# Patient Record
Sex: FEMALE | Race: WHITE | Employment: FULL TIME | URBAN - METROPOLITAN AREA
[De-identification: names, ages, dates, MRNs, and addresses within clinical notes are randomized per-mention and may not be internally consistent; named-entity substitution may affect disease eponyms.]

---

## 2023-03-30 ENCOUNTER — TELEPHONE (OUTPATIENT)
Dept: PRIMARY CARE | Facility: CLINIC | Age: 58
End: 2023-03-30
Payer: COMMERCIAL

## 2023-03-30 NOTE — TELEPHONE ENCOUNTER
Patient went to do her stress test yesterday and they stuck her 6 times and still would not get the iv in.   Patient wants to know if she can get

## 2023-05-11 ENCOUNTER — OFFICE VISIT (OUTPATIENT)
Dept: PRIMARY CARE | Facility: CLINIC | Age: 58
End: 2023-05-11
Payer: COMMERCIAL

## 2023-05-11 VITALS
HEART RATE: 72 BPM | WEIGHT: 293 LBS | RESPIRATION RATE: 18 BRPM | SYSTOLIC BLOOD PRESSURE: 120 MMHG | DIASTOLIC BLOOD PRESSURE: 70 MMHG | HEIGHT: 66 IN | BODY MASS INDEX: 47.09 KG/M2

## 2023-05-11 DIAGNOSIS — R06.02 SHORTNESS OF BREATH: ICD-10-CM

## 2023-05-11 DIAGNOSIS — E78.2 MIXED HYPERLIPIDEMIA: ICD-10-CM

## 2023-05-11 DIAGNOSIS — R73.09 ELEVATED GLUCOSE: Primary | ICD-10-CM

## 2023-05-11 DIAGNOSIS — R73.03 PREDIABETES: ICD-10-CM

## 2023-05-11 DIAGNOSIS — E66.01 CLASS 3 SEVERE OBESITY DUE TO EXCESS CALORIES WITH SERIOUS COMORBIDITY AND BODY MASS INDEX (BMI) OF 50.0 TO 59.9 IN ADULT (MULTI): Chronic | ICD-10-CM

## 2023-05-11 DIAGNOSIS — G62.9 NEUROPATHY: ICD-10-CM

## 2023-05-11 PROCEDURE — 1036F TOBACCO NON-USER: CPT | Performed by: INTERNAL MEDICINE

## 2023-05-11 PROCEDURE — 3008F BODY MASS INDEX DOCD: CPT | Performed by: INTERNAL MEDICINE

## 2023-05-11 PROCEDURE — 99214 OFFICE O/P EST MOD 30 MIN: CPT | Performed by: INTERNAL MEDICINE

## 2023-05-11 RX ORDER — CYCLOBENZAPRINE HCL 10 MG
1 TABLET ORAL 3 TIMES DAILY
COMMUNITY
Start: 2021-10-07

## 2023-05-11 RX ORDER — METFORMIN HYDROCHLORIDE 500 MG/1
500 TABLET ORAL 2 TIMES DAILY
COMMUNITY
Start: 2023-05-01

## 2023-05-11 RX ORDER — HYDROCODONE BITARTRATE AND ACETAMINOPHEN 5; 300 MG/1; MG/1
1 TABLET ORAL EVERY 6 HOURS PRN
COMMUNITY

## 2023-05-11 RX ORDER — LIOTHYRONINE SODIUM 5 UG/1
TABLET ORAL
COMMUNITY

## 2023-05-11 RX ORDER — PREGABALIN 100 MG/1
CAPSULE ORAL
COMMUNITY

## 2023-05-11 RX ORDER — ALBUTEROL SULFATE 90 UG/1
2 AEROSOL, METERED RESPIRATORY (INHALATION) EVERY 4 HOURS PRN
COMMUNITY
Start: 2023-03-20

## 2023-05-11 ASSESSMENT — PAIN SCALES - GENERAL: PAINLEVEL: 4

## 2023-05-31 ENCOUNTER — TELEPHONE (OUTPATIENT)
Dept: PRIMARY CARE | Facility: CLINIC | Age: 58
End: 2023-05-31
Payer: COMMERCIAL

## 2023-06-01 NOTE — TELEPHONE ENCOUNTER
Patient calling states that she received her calcium scoring test results and would like to make a follow up appointment with Dr. Holder. Patient's calcium scoring test results are in Epic. Please review and let me know where to add her in.

## 2023-06-01 NOTE — TELEPHONE ENCOUNTER
I called the pt she was not able to get into the [facility] location until the end of July.  So she went and had the cardiac CT done at [facility] across the street from this building yesterday. She said you should be receiving the results soon.

## 2023-06-05 ENCOUNTER — TELEPHONE (OUTPATIENT)
Dept: PRIMARY CARE | Facility: CLINIC | Age: 58
End: 2023-06-05
Payer: COMMERCIAL

## 2023-06-05 NOTE — TELEPHONE ENCOUNTER
Brian Holder,  Pt called to schedule an appt to discuss CT results with you. Judith and I looked over your schedule and it doesn't look like you have any 30 minute appointments until 06/29/2023. Could we put pt in a 15 minute slot or would you prefer a 30 minute visit? Thank you.

## 2023-06-06 NOTE — TELEPHONE ENCOUNTER
Pt called again about scheduling an appoint to see Dr. Holder and it has been over a week ands he  is very frustrated she has not heard back from anyone.  She would like to schedule an appoint regarding her CT scan and what she should do as she is very concerned about her results. Please advise.

## 2023-06-07 NOTE — TELEPHONE ENCOUNTER
Spoke with patient and apologized for the delay. She would like to come in June 14th at 4pm. I will add her to Dr. Holder's schedule.

## 2023-06-14 ENCOUNTER — OFFICE VISIT (OUTPATIENT)
Dept: PRIMARY CARE | Facility: CLINIC | Age: 58
End: 2023-06-14
Payer: COMMERCIAL

## 2023-06-14 VITALS
HEART RATE: 81 BPM | HEIGHT: 68 IN | BODY MASS INDEX: 44.41 KG/M2 | WEIGHT: 293 LBS | DIASTOLIC BLOOD PRESSURE: 70 MMHG | SYSTOLIC BLOOD PRESSURE: 116 MMHG | OXYGEN SATURATION: 95 %

## 2023-06-14 DIAGNOSIS — Q25.79 PULMONARY ARTERY ABNORMALITY (HHS-HCC): ICD-10-CM

## 2023-06-14 DIAGNOSIS — R07.9 CHEST PAIN, UNSPECIFIED TYPE: ICD-10-CM

## 2023-06-14 DIAGNOSIS — I25.83 CORONARY ARTERY DISEASE DUE TO LIPID RICH PLAQUE: ICD-10-CM

## 2023-06-14 DIAGNOSIS — R06.02 SHORTNESS OF BREATH: Primary | ICD-10-CM

## 2023-06-14 DIAGNOSIS — I25.10 CORONARY ARTERY DISEASE DUE TO LIPID RICH PLAQUE: ICD-10-CM

## 2023-06-14 PROCEDURE — 3008F BODY MASS INDEX DOCD: CPT | Performed by: INTERNAL MEDICINE

## 2023-06-14 PROCEDURE — 1036F TOBACCO NON-USER: CPT | Performed by: INTERNAL MEDICINE

## 2023-06-14 PROCEDURE — 99214 OFFICE O/P EST MOD 30 MIN: CPT | Performed by: INTERNAL MEDICINE

## 2023-06-14 RX ORDER — ROSUVASTATIN CALCIUM 5 MG/1
TABLET, COATED ORAL
Qty: 30 TABLET | Refills: 5 | Status: SHIPPED | OUTPATIENT
Start: 2023-06-14

## 2023-06-14 RX ORDER — PREGABALIN 100 MG/1
2 CAPSULE ORAL 2 TIMES DAILY
COMMUNITY
Start: 2020-07-16

## 2023-06-14 RX ORDER — LIOTHYRONINE SODIUM 5 UG/1
TABLET ORAL
COMMUNITY

## 2023-06-14 ASSESSMENT — PATIENT HEALTH QUESTIONNAIRE - PHQ9
1. LITTLE INTEREST OR PLEASURE IN DOING THINGS: NOT AT ALL
SUM OF ALL RESPONSES TO PHQ9 QUESTIONS 1 AND 2: 1
10. IF YOU CHECKED OFF ANY PROBLEMS, HOW DIFFICULT HAVE THESE PROBLEMS MADE IT FOR YOU TO DO YOUR WORK, TAKE CARE OF THINGS AT HOME, OR GET ALONG WITH OTHER PEOPLE: NOT DIFFICULT AT ALL
2. FEELING DOWN, DEPRESSED OR HOPELESS: SEVERAL DAYS

## 2023-06-14 NOTE — PATIENT INSTRUCTIONS
Begin 81 mg Aspirin once daily.  Begin crestor 5 mg once nightly; can start with every other night if you are concerned over side effect.  Schedule echocardiogram.  Schedule with Dr Leonard.   See me in August.

## 2023-06-14 NOTE — PROGRESS NOTES
"Subjective   Andie Saavedra is a 57 y.o. female who presents for Results (CT Results).    She had been having tightness in her chest with exertion  She has also been having more shortness of breath, occurring with almost any exertion  She was unable to complete stress test due to failure of venous access  She has hyperlipidemia ; labs were at oustide lab  Her cardiac CT showed a score of 123; also some prominence of main pulmonary artery suggestive of pulmonary hypertension, and upper limits of normal of ascending aorta.    She has sleep apena and using CPAP  Review of Systems    Objective   /70   Pulse 81   Ht 1.727 m (5' 8\")   Wt 148 kg (326 lb 6.4 oz)   SpO2 95%   BMI 49.63 kg/m²    Physical Exam  Visit Vitals  /70   Pulse 81   Ht 1.727 m (5' 8\")   Wt 148 kg (326 lb 6.4 oz)   SpO2 95%   BMI 49.63 kg/m²   Smoking Status Former   BSA 2.66 m²      GEN: NAD. No cyanosis.   HEENT: normal  NECK: no adenopathy, no thyroid enlargment  LUNGS: CTAB. Respirs are normal and unlabored  CV: reg S1/S2 no murmurs  EXT: no leg edema   Assessment/Plan   Problem List Items Addressed This Visit    None    CAD: discussed sytemic nature of atherosclerosis and finding on CT could indicate more of a problem than it seems. She will begin rosuvastatin 5 mg every other day. Also beign 81 mg ASA daily.   Abnormal Pulmonary Artery : discussed that echo would be a better test for Pulm Htn. Answered questions about pulm hypertension: she does have Sleep apnea which is a risk factor for this.   Chest pain : refer to cardiology for ischemia testing.   Shortness of breath : see above.      "

## 2023-08-16 ENCOUNTER — APPOINTMENT (OUTPATIENT)
Dept: PRIMARY CARE | Facility: CLINIC | Age: 58
End: 2023-08-16
Payer: COMMERCIAL

## 2023-12-19 ENCOUNTER — OFFICE VISIT (OUTPATIENT)
Dept: SLEEP MEDICINE | Facility: CLINIC | Age: 58
End: 2023-12-19
Payer: COMMERCIAL

## 2023-12-19 VITALS
OXYGEN SATURATION: 98 % | SYSTOLIC BLOOD PRESSURE: 165 MMHG | HEART RATE: 87 BPM | DIASTOLIC BLOOD PRESSURE: 96 MMHG | BODY MASS INDEX: 50.48 KG/M2 | WEIGHT: 293 LBS

## 2023-12-19 DIAGNOSIS — G47.33 OBSTRUCTIVE SLEEP APNEA: Primary | ICD-10-CM

## 2023-12-19 DIAGNOSIS — E66.01 CLASS 3 SEVERE OBESITY WITH SERIOUS COMORBIDITY AND BODY MASS INDEX (BMI) OF 50.0 TO 59.9 IN ADULT, UNSPECIFIED OBESITY TYPE (MULTI): ICD-10-CM

## 2023-12-19 PROBLEM — F45.8 AEROPHAGIA: Status: ACTIVE | Noted: 2023-12-19

## 2023-12-19 PROBLEM — E66.813 CLASS 3 SEVERE OBESITY IN ADULT: Status: ACTIVE | Noted: 2023-12-19

## 2023-12-19 PROBLEM — I25.10 CAD (CORONARY ARTERY DISEASE): Status: ACTIVE | Noted: 2023-12-19

## 2023-12-19 PROBLEM — J34.89 NASAL OBSTRUCTION: Status: ACTIVE | Noted: 2023-12-19

## 2023-12-19 PROBLEM — I27.20 PULMONARY HTN (MULTI): Status: ACTIVE | Noted: 2023-12-19

## 2023-12-19 PROBLEM — L03.114 CELLULITIS OF HAND, LEFT: Status: ACTIVE | Noted: 2022-05-10

## 2023-12-19 PROBLEM — R73.9 HYPERGLYCEMIA: Status: ACTIVE | Noted: 2023-12-19

## 2023-12-19 PROBLEM — E78.5 ELEVATED LIPIDS: Status: ACTIVE | Noted: 2023-12-19

## 2023-12-19 PROCEDURE — 99213 OFFICE O/P EST LOW 20 MIN: CPT | Performed by: NURSE PRACTITIONER

## 2023-12-19 PROCEDURE — 3008F BODY MASS INDEX DOCD: CPT | Performed by: NURSE PRACTITIONER

## 2023-12-19 PROCEDURE — 1036F TOBACCO NON-USER: CPT | Performed by: NURSE PRACTITIONER

## 2023-12-19 ASSESSMENT — SLEEP AND FATIGUE QUESTIONNAIRES
HOW LIKELY ARE YOU TO NOD OFF OR FALL ASLEEP WHILE SITTING AND TALKING TO SOMEONE: WOULD NEVER DOZE
SITING INACTIVE IN A PUBLIC PLACE LIKE A CLASS ROOM OR A MOVIE THEATER: WOULD NEVER DOZE
HOW LIKELY ARE YOU TO NOD OFF OR FALL ASLEEP WHILE WATCHING TV: SLIGHT CHANCE OF DOZING
WORRIED_DISTRESSED_DUE_TO_SLEEP: NOT AT ALL NOTICEABLE
HOW LIKELY ARE YOU TO NOD OFF OR FALL ASLEEP IN A CAR, WHILE STOPPED FOR A FEW MINUTES IN TRAFFIC: SLIGHT CHANCE OF DOZING
SLEEP_PROBLEM_NOTICEABLE_TO_OTHERS: SOMEWHAT
SLEEP_PROBLEM_INTERFERES_DAILY_ACTIVITIES: A LITTLE
ESS-CHAD TOTAL SCORE: 7
HOW LIKELY ARE YOU TO NOD OFF OR FALL ASLEEP WHILE SITTING QUIETLY AFTER LUNCH WITHOUT ALCOHOL: WOULD NEVER DOZE
WAKING_TOO_EARLY: MILD
SATISFACTION_WITH_CURRENT_SLEEP_PATTERN: SATISFIED
HOW LIKELY ARE YOU TO NOD OFF OR FALL ASLEEP WHILE LYING DOWN TO REST IN THE AFTERNOON WHEN CIRCUMSTANCES PERMIT: MODERATE CHANCE OF DOZING
HOW LIKELY ARE YOU TO NOD OFF OR FALL ASLEEP WHEN YOU ARE A PASSENGER IN A CAR FOR AN HOUR WITHOUT A BREAK: MODERATE CHANCE OF DOZING
HOW LIKELY ARE YOU TO NOD OFF OR FALL ASLEEP WHILE SITTING AND READING: SLIGHT CHANCE OF DOZING

## 2023-12-19 NOTE — PROGRESS NOTES
Patient: Pebbles Saavedra    [id number]  : 1965 -- AGE 57 y.o.    Provider: OSMANY Oakley     Location [facility]   Service Date: 2023              [facility]  Followup Visit Note      HISTORY OF PRESENT ILLNESS       HISTORY OF PRESENT ILLNESS   Pebbles Saavedra is a 57 y.o. female with past medical history of REDD, CAD, pulmonary hypertension, obesity, hypothyroidism who presents to a [facility] for followup. PEBBLES is a .     PAST SLEEP HISTORY  PEBBLES has had a split night sleep study completed on 22 at [facility] showing severe REDD with an RDI 3% of 62.7 and SpO2 sydney of 50%. CPAP was titrated to optimal pressure of 17 with an rRDI of 2.6 and SpO2 sydney of 93%. Recommendation was for initiation of CPAP at 17 with Simplus FFM. Previous testing about 10 years ago, was set up with device but stopped using due to aerophagia.     CURRENT SLEEP HISTORY    On today's visit, the patient reports doing well on her machine. Feels she sleeps well with PAP. Went back to nasal mask- feels it is fitting well. No issues with air pressure or humidity currently. She is debating bariatric surgery through [facility]. She was recommended per chiropractor to sleep on her back, but prefers her side. She occasionally sleeps on the couch and does no wear her PAP machine.     RLS Followup:  no current complaints     Insomnia follow up:  Bedtime: 10- 10:30 PM  Sleep Latency: 3 min  Awakenings:   Wake time: 4:30- 5:30 AM  Naps:   none    ESS: 7   RAQUEL: 5  FOSQ: 31    REVIEW OF SYSTEMS     REVIEW OF SYSTEMS  Review of Systems   All other systems reviewed and are negative.      ALLERGIES AND MEDICATIONS     ALLERGIES  Allergies   Allergen Reactions    Codeine Unknown     vomiting       MEDICATIONS  Current Outpatient Medications   Medication Sig Dispense Refill    albuterol 90 mcg/actuation inhaler Inhale 2 puffs  every 4 hours if needed.      cyclobenzaprine (Flexeril) 10 mg tablet Take 1 tablet (10 mg) by mouth 3 times a day.      HYDROcodone-acetaminophen (Vicodin) 5-300 mg tablet Take 1 tablet by mouth every 6 hours if needed.      INULIN ORAL Take 1 tablet by mouth once daily.      liothyronine (Cytomel) 5 mcg tablet Take by mouth once daily.      liothyronine (Cytomel) 5 mcg tablet Take by mouth once daily.      metFORMIN (Glucophage) 500 mg tablet Take 1 tablet (500 mg) by mouth 2 times a day.      pregabalin (Lyrica) 100 mg capsule TAKE 2 CAPSULES BY MOUTH EVERY MORNING AND 2 CAPSULES BY MOUTH EVERY EVENING.      pregabalin (Lyrica) 100 mg capsule Take 2 capsules (200 mg) by mouth 2 times a day.      rosuvastatin (Crestor) 5 mg tablet One tablet nightly. 30 tablet 5     No current facility-administered medications for this visit.       PPAST MEDICAL HISTORY  History reviewed. No pertinent past medical history.    PAST SURGICAL HISTORY:  Past Surgical History:   Procedure Laterality Date    CT ANGIO CORONARY ART WITH HEARTFLOW IF SCORE >30%  5/31/2023    CT ANGIO CORONARY ART WITH HEARTFLOW IF SCORE >30% 5/31/2023    OTHER SURGICAL HISTORY  08/19/2022    Cholecystectomy    OTHER SURGICAL HISTORY  08/19/2022    Knee replacement    OTHER SURGICAL HISTORY  08/19/2022    Foot surgery    OTHER SURGICAL HISTORY  08/19/2022    Ankle surgery       FAMILY HISTORY  No family history on file.    FAMILY HISTORY: No changes since previous visit. Otherwise non-contributory as charted.       SOCIAL HISTORY  She  reports that she has quit smoking. Her smoking use included cigarettes. She has a 45.00 pack-year smoking history. She has quit using smokeless tobacco. She reports that she does not drink alcohol and does not use drugs.       PHYSICAL EXAM     VITAL SIGNS: BP (!) 165/96   Pulse 87   Wt (!) 151 kg (332 lb)   SpO2 98%   BMI 50.48 kg/m²      PREVIOUS WEIGHTS:  Wt Readings from Last 3 Encounters:   12/19/23 (!) 151 kg (332  "lb)   06/22/23 149 kg (329 lb)   06/20/23 149 kg (329 lb)       Physical Exam  Physical Exam   Constitutional: Alert and oriented, cooperative, no obvious distress   HEENT: Non icteric or anemic, EOM WNL bilaterally   Neck: Supple, no JVD, no goiter, no adenopathy, no rigidity  Chest: CTA bilaterally, no wheezing, crackles, rubs   Cardiac: RRR, S1 and S2, no murmur, rub, thrill   Abdomen: Obese, Soft, nontender, no masses, no organomegaly   Extremities: No clubbing, no LL edema   Neuromuscular: Cranial nerves grossly intact, no focal deficits      RESULTS/DATA     No results found for: \"CO2\", \"IRON\", \"TRANSFERRIN\", \"IRONSAT\", \"TIBC\", \"FERRITIN\"    PAP Adherence:      Aircurve 10 Bilevel Auto- 4/5/2023  Airtouch N20 nasal small---> vitera medium- given in clinic 6/20/2023        ASSESSMENT/PLAN     Ms. Saavedra is a 57 y.o. female and She returns in followup to the [facility] for REDD.    Problem List and Orders  Problem List Items Addressed This Visit             ICD-10-CM    Class 3 severe obesity in adult (CMS/HCC) E66.01     Considering WLS via [facility].  Seeing bariatric NP at [facility] currently  Will continue to monitor  Currently optimized on PAP, recommended to use nightly for greatest benefit.         Obstructive sleep apnea - Primary G47.33     PEBBLES has had a split night sleep study completed on 7/21/22 at [facility] showing severe REDD with an RDI 3% of 62.7 and SpO2 sydney of 50%. CPAP was titrated to optimal pressure of 17 with an rRDI of 2.6 and SpO2 sydney of 93%. Recommendation was for initiation of CPAP at 17 with Simplus FFM. Previous testing about 10 years ago, was set up with device but stopped using due to aerophagia.     Tolerating auto bilevel PAP well. Continue current settings via [facility] with nasal mask  Supply order updated today         Relevant Orders    Positive Airway Pressure (PAP) Therapy       Disposition    Return to clinic in 6-12 months       "

## 2023-12-19 NOTE — PATIENT INSTRUCTIONS
I will be off on leave of absence January through April. Please schedule follow up with one of my colleagues if need to be seen during this time frame.    Dr. Arnie Banerjee, [place] [facility] and Cy Cabezas or Linda Rodríguez and Mini Ward CNP- Cooper Banerjee and Jhonatan Enriquez, EDWARD, Cooper Banerjee Medina Selen Bruck. NP, Marisa and [facility] Sleep Medicine  [facility] [address]  [address] [place] [address]       NAME: Andie Saavedra   DATE:  12/19/23    DIAGNOSIS:   1. Obstructive sleep apnea            Thank you for coming to the Sleep Medicine Clinic today! Your sleep medicine provider today was: OSMANY Oakley Below is a summary of your treatment plan, other important information, and our contact numbers:    TREATMENT PLAN:   - Follow-up in 12 months.  - If not already done, sign up for 'My Chart' and send prescription requests or messages through this      Annual Reminders About Your Sleep Apnea    Your sleep apnea is well controlled based on reviewing your PAP Data Report.     General Reminders:  Continue current machine settings. Continue using machine every night. Need at least 4 hours daily usage.   Remember to clean your mask, tubings, and water chamber regularly as instructed.  Know the replacement schedule of your supplies/ accessories and contact your DME (durable medical equipment) provider if you are due for them.  Avoid driving or operating heavy machinery when drowsy. A person driving while sleepy is 5 times more likely to have an accident. If you feel sleepy, pull over and take a short power nap (sleep for less than 30 minutes). Otherwise, ask somebody to drive you.    Follow-up sooner through [employer] or calling our office if you have any of the following symptoms:  Snoring or stopping breathing while using the machine  Recurrence of fatigue,  sleepiness, insomnia, and other symptoms you had prior using machine  Persistent or worsening fatigue or sleepiness despite regular use of machine  Issues tolerating the machine like bloating sensation, air hunger, too much hot air, too much pressure, taking off mask without recall in the middle of sleep, etc.     Other conservative measures to improve sleep apnea:  Losing weight can lead to some improvement of REDD which means you will need lower pressures in machine to control your REDD. In some patients, they don't need to use the machine after bariatric surgery. Hence, consider medical and/or surgical weight loss especially if your BMI is more than 35.  Avoiding alcohol, sedative-hypnotics, and sedating medications is imperative as these substances can worsen snoring and sleep apnea  If you have nasal congestion or seasonal allergies, improving your breathing through the nose is critical for treating sleep apnea, tolerating CPAP, and improving your sleep; hence, using intranasal steroid spray like Flonase might help. Make sure you know the proper way to use it.  Stay off your back when sleeping.    Common issues with PAP machine:  Mask gets dislodged when turning to the side: Consider getting a CPAP pillow or switching to a mask with hose on top.     Dry mouth:  Your machine has built-in humidifier that heats up the air to prevent dry mouth. It can be adjusted to your comfort. You can try that first and increase setting one level one night at a time to check which setting is comfortable and effective in lessening dry mouth. If dry mouth persists despite humidity setting adjustment, may apply OTC Biotene gel over the gums at bedtime.  If Biotene gel is not effective, consider trying XEROSTOM gel from [employer].  Also, eliminate or reduce dose of meds that can cause dry mouth if possible. Lastly, may try getting a separate room humidifier machine.    Airleaks: Please call DME as they may need to adjust your mask or  "refit you with a different kind of mask. In addition, you can ask DME for tips on getting a good mask seal and mask fit.     Difficulty tolerating the mask: Contact your DME to try a different kind of mask and/or call office to get a referral to Sleep Psychologist for CPAP desensitization. CPAP desensitization technique is a set of strategies that helps patient cope with claustrophobia and anxiety related to wearing mask. Alternatively, we can do a daytime mini-sleep study called PAP-nap trial wherein you will try on different kinds of mask and the sleep technician will try different pressure settings on CPAP and BPAP machines to see which specific pressure is tolerable and comfortable for you.     Water droplets or moisture within the hose and/or mask: This is called rain-out and it is caused by condensation of too much heated humidity on the cooler walls of the hose. If you have rain-out, turn down humidity settings or get a heated hose. If you already have a heated hose, turn up the \"tube temperature\" of the heated hose. Alternatively, if you don't want to get a heated hose or warmer air, may wrap the CPAP hose with stockings to keep it somewhat warm. Also, you need to place the machine on the floor and lower the hose so that water won't travel upward towards your mask.     You can also go to the following EDUCATION WEBSITES for further information:   American Academy of Sleep Medicine http://sleepeducation.org  National Sleep Foundation: https://sleepfoundation.org  American Sleep Apnea Association: https://www.sleepapnea.org (for patients with sleep apnea)    Here at [facility], we wish you a restful sleep!     Instructions - Common REDD Recs: - For your sleep apnea, continue to use your PAP every night and use it whenever you are sleeping.   - Avoid alcohol or sedatives several hours prior to sleeping.   - Get additional supplies for your PAP (e.g., mask, hose, filters) every 3 months or as your " insurance allows from your [employer] company. Replacement cushions for your PAP mask can be requested monthly if airseals are an issue.  - Remember to clean your mask, tubings, and water chamber regularly as instructed.  - Avoid driving or operating heavy machinery when drowsy. A person driving while sleepy is five (5) times more likely to have an accident. If you feel sleepy, pull over and take a short power nap (sleep for less than 30 minutes). Otherwise, ask somebody to drive you.    EASY WAYS TO IMPROVE YOUR SLEEP:  1. Go to bed and wake up at the same time every day.   Aim for 8 hours but some people need less, some need more.   Get out of bed if you are not sleeping.   Limit naps to 20 min or less.   2. Expose yourself to daylight and/ or bright light in the morning.   Go outside or spend time near a window each morning.   You can use a light box (found on Amazon) if you wake before the sunrise.   Limit light exposure in the evenings (including electronic usage).   Try meditation, reading, stretching, deep breathing, warm shower or bath, or yoga nidra as part of your bedtime routine. There are many great FREE, videos or audio tracks on [employer]/ "[employer] etc for guidance.  3. Exercise, in some form, EVERY day, but not too close to bedtime. Consider making this part of your routine at the start of your day, followed by a cool shower.  4. Eat meals at roughly the same time every day. Make sure you are prioritizing fruits, vegetables, whole grains, lean proteins.  5. Time your caffeine intake. Make sure you are not drinking caffeine within 8 to 12 hours prior to your bedtime.   6. Avoid marijuana, alcohol, and nicotine. They will reduce sleep quality in any quantity.  7. Learn to manage anxiety. Psychology services at  can be reached at [phone] to schedule an appointment.     IMPORTANT INFORMATION:     Call 911 for medical emergencies.  Our offices are generally open from Monday-Friday, 9 am - 5 pm.  If you need  to get in touch with me, you may either call me and my team(number is below) or you can use [employer].  If a referral for a test, for CPAP, or for another specialist was made, and you have not heard about scheduling this within a week, please call scheduling at [phone] ([phone]).  If you are unable to make your appointment for clinic or an overnight study, kindly call the office at least 48 hours in advance to cancel and reschedule.  If you are on CPAP, please bring your device's card to each clinic appointment unless told otherwise by your provider.  There are no supporting services by either the sleep doctors or their staff on weekends and Holidays, or after 5 PM on weekdays.   If you have been asked to come to a sleep study, make sure you bring toiletries, a comfy pillow, and any nighttime medications that you may regularly take. Also be sure to eat dinner before you arrive. We generally do not provide meals.      PRESCRIPTIONS:  We require 7 days advanced notice for prescription refills. If we do not receive the request in this time, we cannot guarantee that your medication will be refilled in time. Please contact the sleep nurses listed below for refills or request via [employer].     IMPORTANT PHONE NUMBERS:   Sleep Medicine Clinic Fax: [phone]  Appointments (for Pediatric Sleep Clinic): [phone] - option 1  Appointments (for Adult Sleep Clinic): [phone] - option 2  Appointments (For Sleep Studies): [phone] - option 3  Behavioral Sleep Medicine: [phone]  Sleep Surgery: [phone]  ENT (Otolaryngology): [phone]  Headache Clinic (Neurology): [phone]  Neurology: [phone]  Psychiatry: [phone]  Pulmonary Function Testing (PFT) Center: [phone]  Pulmonary Medicine: [phone]  [employer] (DME): [phone]  [employer] (DME): [phone]  [facility] ([facility]): [phone]    Our Adult Sleep Medicine Team (Please do not  hesitate to call the office or sleep nurse with any questions between appointments):    Adult Sleep Nurses (Beth Lucero, RN and Estefani Carlton, RN):  For clinical questions and refilling prescriptions: [phone]  Email sleep diaries and other documents at: [email or web address]    Adult Sleep Medicine Secretaries:  Toma Navarrete (For Alfred/Kaba/Krise/Strohl/Yeh/Ward):   P: [phone]  F: [phone]  Tahira Lomas (For Olivares/Guggenbiller): P: [phone]  Fax: [phone]  Grace Blanco (For Shira/Blank): P: [phone]  F: [phone]  Yesica Montemayor (For Héctor): P: [phone]  F: [phone]  Erika Gipson (For Audrey/Emerson/Zakhary): P: [phone]  F: [phone]  Mihaela Stevenson (For Raulito/Yash): P: [phone]  F: [phone]     Adult Sleep Medicine Advanced Practice Providers:  Price Zepeda ([place], [place])  Marija Norman ([facility])  Sierra Cr CNP ([facility], [facility])  Amy Enriquez CNP ([place], [place], [facility])  Kiana Anaya ([facility] [place] [place], [facility])  Linda Berrios CNP ([facility])      Our Sleep Testing Center (STC) Locations:  Our team will contact you to schedule your sleep study, however, you can contact us as follow:  Main Phone Line (scheduling only): [phone]), option 3  Adult and Pediatric Locations  [facility] (6 years and older): [facility] - 4th floor ([address] [place] [address]) After hours line: [phone]  [facility] (Main campus: All ages): [place], 6th floor. After hours line: [phone]   [place] (5 years and older; younger considered on case-by-case basis): [address]; [address], [address]. Scheduling  After hours line: [phone]   [place] (6 years and older): [address]; [address]; [address]   [place] (6 years and older): [address]  After hours  "line: [phone]   [place] (13 years and older) in [place]: [address], 2nd floor  After hours line: [phone]   [place] (13 year and older): [address]  After hours line: [phone] (Home studies out of [facility])    Adult Only Locations:   [place] (18 years and older): [address] [facility], 2nd floor   [place] (18 years and older): [address] [facility]; 4th floor  After hours line: [phone]  [facility] (18 years and older) at [place]: [address] [facility]  After hours line: [phone]        CONTACTING YOUR SLEEP MEDICINE PROVIDER:  Send a message directly to your provider through \"My Chart\", which is the email service through your  Records Account: [email or web address]   Call [phone] and leave a message. One of the administrative assistants will forward the message to your sleep medicine provider through \"My Chart\" and/or email.     Your sleep medicine provider for this visit was: Sierra Cr, APRN-CNP    In the event that you are running more than 15 minutes late to your appointment, I will kindly ask you to reschedule.         "

## 2024-01-08 ENCOUNTER — TELEPHONE (OUTPATIENT)
Dept: CARDIOLOGY | Facility: CLINIC | Age: 59
End: 2024-01-08
Payer: COMMERCIAL

## 2024-01-08 NOTE — TELEPHONE ENCOUNTER
Rec'd fax from Mi De Leon, APRN, CNP requesting ok for patient to start Phentermine 37.5mg 1/2 tab for wt loss. Patient did not qualify for Ozempic/Mounjaro/Trulicity. Patient was also started on Jardiance 10mg.  Reports patient has been advised to monitor bp's at home, conts to run 140/80 in office.

## 2024-01-20 NOTE — PATIENT INSTRUCTIONS
Podiatry saw and evaluated while you were here.  They cleared you for discharge and you are weightbearing as tolerated as long as you have the boot on.  They recommended follow-up outpatient:    Please follow up at the [place] office located [address]  [address] [facility] [address].  You may call [phone]    Please follow with your PCP upon discharge as hospital follow-up   Schedule cardiac CAT scan.  [facility] weight loss is in [place].   See me again in 3 to 4 months.

## 2024-11-04 ENCOUNTER — APPOINTMENT (OUTPATIENT)
Dept: PRIMARY CARE | Facility: CLINIC | Age: 59
End: 2024-11-04
Payer: COMMERCIAL

## 2024-11-04 VITALS
RESPIRATION RATE: 16 BRPM | BODY MASS INDEX: 42.8 KG/M2 | HEART RATE: 96 BPM | WEIGHT: 282.4 LBS | HEIGHT: 68 IN | SYSTOLIC BLOOD PRESSURE: 143 MMHG | DIASTOLIC BLOOD PRESSURE: 83 MMHG

## 2024-11-04 DIAGNOSIS — G47.33 OBSTRUCTIVE SLEEP APNEA: ICD-10-CM

## 2024-11-04 DIAGNOSIS — E06.3 HYPOTHYROIDISM DUE TO HASHIMOTO THYROIDITIS: ICD-10-CM

## 2024-11-04 DIAGNOSIS — M48.062 SPINAL STENOSIS OF LUMBAR REGION WITH NEUROGENIC CLAUDICATION: ICD-10-CM

## 2024-11-04 DIAGNOSIS — R73.03 PREDIABETES: ICD-10-CM

## 2024-11-04 DIAGNOSIS — M41.26 OTHER IDIOPATHIC SCOLIOSIS, LUMBAR REGION: ICD-10-CM

## 2024-11-04 DIAGNOSIS — Z01.818 PRE-OP EVALUATION: ICD-10-CM

## 2024-11-04 DIAGNOSIS — R11.0 NAUSEA: Primary | ICD-10-CM

## 2024-11-04 DIAGNOSIS — E78.5 ELEVATED LIPIDS: ICD-10-CM

## 2024-11-04 PROCEDURE — 99214 OFFICE O/P EST MOD 30 MIN: CPT | Performed by: INTERNAL MEDICINE

## 2024-11-04 PROCEDURE — 3008F BODY MASS INDEX DOCD: CPT | Performed by: INTERNAL MEDICINE

## 2024-11-04 RX ORDER — ONDANSETRON 4 MG/1
4 TABLET, ORALLY DISINTEGRATING ORAL EVERY 8 HOURS PRN
Qty: 30 TABLET | Refills: 2 | Status: SHIPPED | OUTPATIENT
Start: 2024-11-04

## 2024-11-04 ASSESSMENT — PAIN SCALES - GENERAL: PAINLEVEL_OUTOF10: 0-NO PAIN

## 2024-11-04 ASSESSMENT — PATIENT HEALTH QUESTIONNAIRE - PHQ9
SUM OF ALL RESPONSES TO PHQ9 QUESTIONS 1 AND 2: 0
2. FEELING DOWN, DEPRESSED OR HOPELESS: NOT AT ALL
1. LITTLE INTEREST OR PLEASURE IN DOING THINGS: NOT AT ALL

## 2024-11-04 NOTE — PROGRESS NOTES
Subjective   Patient ID: Andie Saavedra is a 58 y.o. female who presents for Follow-up (Pt here for surgery clearance. ).    HPI     She is going to have surgery 11/11/24 at the [facility]  Bilateral L3/L4 microdecompression, Right L3/4 microdiscectomy with foraminotimies with General Anesthesia    she has severe lumbar spinal stenosis and scoliosis      She has no history of DVT or PE  No history of bleeding disorder    She has had knee replacement, ankle surgery, and cholecystectomy    She has REDD and uses CPAP ( ? Bi PAP)     She started doing semaglutide in May by another provider  She said at first she had lots of nausea with it   She kind of adjusted to it, was related to what she had eaten   Her A1c changed from 6.5 to 6.0  She has lost 56 pounds  : was 332 in Dec 2023 and is now 282    Seh said that she is no longer thinking about food, 24/7   She used to be so focused on mealtimes, now she will find she forgets to eat  She does try to do protein     She would like a prn rx for ondansetron     She knows she needs to be off of semaglutide for a week prior to surgery  Review of Systems   Respiratory:  Negative for shortness of breath.    Cardiovascular:  Negative for chest pain and palpitations.         Current Outpatient Medications:     albuterol 90 mcg/actuation inhaler, Inhale 2 puffs every 4 hours if needed., Disp: , Rfl:     cyclobenzaprine (Flexeril) 10 mg tablet, Take 1 tablet (10 mg) by mouth 3 times a day., Disp: , Rfl:     HYDROcodone-acetaminophen (Vicodin) 5-300 mg tablet, Take 1 tablet by mouth every 6 hours if needed., Disp: , Rfl:     INULIN ORAL, Take 1 tablet by mouth once daily., Disp: , Rfl:     liothyronine (Cytomel) 5 mcg tablet, Take by mouth once daily., Disp: , Rfl:     liothyronine (Cytomel) 5 mcg tablet, Take by mouth once daily., Disp: , Rfl:     metFORMIN (Glucophage) 500 mg tablet, Take 1 tablet (500 mg) by mouth 2 times a day., Disp: , Rfl:     pregabalin (Lyrica) 100  "mg capsule, TAKE 2 CAPSULES BY MOUTH EVERY MORNING AND 2 CAPSULES BY MOUTH EVERY EVENING., Disp: , Rfl:     pregabalin (Lyrica) 100 mg capsule, Take 2 capsules (200 mg) by mouth 2 times a day., Disp: , Rfl:     rosuvastatin (Crestor) 5 mg tablet, One tablet nightly., Disp: 30 tablet, Rfl: 5    Objective   /83   Pulse 96   Resp 16   Ht 1.727 m (5' 8\")   Wt 128 kg (282 lb 6.4 oz)   BMI 42.94 kg/m²     Physical Exam  Constitutional:       Appearance: Normal appearance.   HENT:      Mouth/Throat:      Mouth: Mucous membranes are moist.      Pharynx: Oropharynx is clear.   Eyes:      Conjunctiva/sclera: Conjunctivae normal.      Pupils: Pupils are equal, round, and reactive to light.   Cardiovascular:      Rate and Rhythm: Normal rate and regular rhythm.      Heart sounds: Normal heart sounds.   Pulmonary:      Effort: Pulmonary effort is normal.      Breath sounds: Normal breath sounds.   Musculoskeletal:      Right lower leg: Edema present.      Left lower leg: Edema present.   Lymphadenopathy:      Cervical: No cervical adenopathy.   Skin:     General: Skin is warm and dry.   Neurological:      General: No focal deficit present.         Assessment/Plan   Problem List Items Addressed This Visit         Pre Operative Evaluation for lumbar spine surgery   Spinal stenosis of lumbar region with neurogenic claudication   Other idiopathic scoliosis, lumbar region  She is medically optimized for the planned surgery. She will not take semaglutide the week before her surgery.        Prediabetes: remain on metformin 500 one twice a day.   Last A1c from 9/26/24 was 6.0 ,  improved from 6/17/24 when was 6.6  Elevated lipids remain on rosuvastatin .     Relevant Orders    Lipid Panel    Hypothyroidism she remains on liothyronine    Obstructive sleep apnea keep using CPAP ( or Bi PAP)             Other Visit Diagnoses       Nausea    -  rx sent for this as she experiences some nausea from semaglutide.     Relevant " Medications    ondansetron ODT (Zofran-ODT) 4 mg disintegrating tablet    See me in spring for annual physical.

## 2024-11-04 NOTE — PATIENT INSTRUCTIONS
Good luck with your surgery.    I sent in for zofran with some refills.     See me in the spring for annual physical.

## 2024-12-01 PROBLEM — M41.26 OTHER IDIOPATHIC SCOLIOSIS, LUMBAR REGION: Status: ACTIVE | Noted: 2024-12-01

## 2024-12-01 PROBLEM — M48.062 SPINAL STENOSIS OF LUMBAR REGION WITH NEUROGENIC CLAUDICATION: Status: ACTIVE | Noted: 2024-12-01

## 2024-12-01 PROBLEM — F45.8 AEROPHAGIA: Status: RESOLVED | Noted: 2023-12-19 | Resolved: 2024-12-01

## 2024-12-01 ASSESSMENT — ENCOUNTER SYMPTOMS
PALPITATIONS: 0
SHORTNESS OF BREATH: 0

## 2025-04-17 PROBLEM — Z00.00 ROUTINE ADULT HEALTH MAINTENANCE: Status: ACTIVE | Noted: 2025-04-17

## 2025-04-17 PROBLEM — R73.03 PREDIABETES: Status: ACTIVE | Noted: 2025-04-17

## 2025-04-17 NOTE — PROGRESS NOTES
"Subjective   Patient ID: Andie Saavedra is a 59 y.o. female who presents for annual physical.    HPI     Uses progesterone cream and Chaste tree for hot flashes.  Went through menopause 6-7 years ago.    She feels she is stuck in her weight loss.  Staying between 270-275.  Reports she is eating smaller portions, a healthier diet and is walking more for exercise.  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    Has always struggled with constipation since childhood.     Continues seeing [facility]  with sleep medicine.     States that her right leg lymphedema has continued to be more swollen than her left leg   Surgery resolved sciatica but still experiencing muscle pain.  Has to lose 70 more lbs to be considered for back surgery.   Chiropractor and massage helps alleviate pain.  Continues to use cane to walk.    Outside labs  Glucose- 109  Kidney function - good  Liver function- good   Sodium and potassium -good     Does not do mammograms- scheduled for thermograms next month.    Reviewed and updated medication list.    Review of Systems   Constitutional:  Negative for unexpected weight change.        +hot flashes   Respiratory:  Negative for shortness of breath.    Cardiovascular:  Positive for leg swelling (b/l but worse in right). Negative for chest pain and palpitations.   Gastrointestinal:  Positive for constipation. Negative for diarrhea.   Genitourinary: Negative.    Musculoskeletal:  Positive for back pain.       Current Medications[1]    Objective   /82   Pulse 87   Resp 16   Ht 1.727 m (5' 8\")   Wt 126 kg (277 lb 9.6 oz)   BMI 42.21 kg/m²     Physical Exam  Constitutional:       Appearance: Normal appearance.   Eyes:      Conjunctiva/sclera: Conjunctivae normal.      Pupils: Pupils are equal, round, and reactive to light.   Cardiovascular: "      Rate and Rhythm: Normal rate and regular rhythm.      Heart sounds: Normal heart sounds.   Pulmonary:      Effort: Pulmonary effort is normal.      Breath sounds: Normal breath sounds.   Musculoskeletal:      Left hand: Swelling present.      Right lower leg: Swelling present. Edema present.      Left lower leg: Swelling present. Edema present.   Lymphadenopathy:      Cervical: No cervical adenopathy.         Assessment/Plan   Problem List Items Addressed This Visit          Cardiac and Vasculature    CAD (coronary artery disease)    Remain on Rosuvastatin 5 mg and ASA 81 mg daily          Relevant Orders    Comprehensive Metabolic Panel    Lipid Panel    White coat syndrome with high blood pressure without hypertension    Recheck improved to 120/82.            Endocrine/Metabolic    Hypothyroidism    Remain on Cytomel 200 mcg daily          Prediabetes    Remain on Metformin 1,000 mg daily   Check A1c before next visit.         Relevant Orders    Hemoglobin A1C    Comprehensive Metabolic Panel    Albumin-Creatinine Ratio, Urine Random       Health Encounters    Routine adult health maintenance - Primary    Annual physical completed today.  Updated medication list today.  Referral sent today to obtain colonoscopy with Dr Mar.   Ordered urinalysis that she will have done here at             Infectious Diseases    Cellulitis of hand, left       Sleep    Obstructive sleep apnea    Continue using CPAP or BiPAP  Continue following with sleep medicine.            Symptoms and Signs    Lymphedema of both lower extremities     Other Visit Diagnoses         Screen for colon cancer        Relevant Orders    Referral to General Surgery            Please return to see me in 6 months for a 30 minute follow up.     Scribe Attestation  By signing my name below, IAmaya Scribe   attest that this documentation has been prepared under the direction and in the presence of Francine Holder DO.         [1]    Current Outpatient Medications:     metFORMIN  mg 24 hr tablet, Take 2 tablets (1,000 mg) by mouth 2 times daily (morning and late afternoon). Do not crush, chew, or split., Disp: , Rfl:     semaglutide 2.5 mg/mL solution, Inject 2.5 mg under the skin 1 (one) time per week., Disp: , Rfl:     albuterol 90 mcg/actuation inhaler, Inhale 2 puffs every 4 hours if needed., Disp: , Rfl:     cyclobenzaprine (Flexeril) 10 mg tablet, Take 1 tablet (10 mg) by mouth 3 times a day., Disp: , Rfl:     HYDROcodone-acetaminophen (Vicodin) 5-300 mg tablet, Take 1 tablet by mouth every 6 hours if needed., Disp: , Rfl:     INULIN ORAL, Take 1 tablet by mouth once daily., Disp: , Rfl:     liothyronine (Cytomel) 5 mcg tablet, Take by mouth once daily., Disp: , Rfl:     liothyronine (Cytomel) 5 mcg tablet, Take by mouth once daily., Disp: , Rfl:     ondansetron ODT (Zofran-ODT) 4 mg disintegrating tablet, Take 1 tablet (4 mg) by mouth every 8 hours if needed for nausea or vomiting., Disp: 30 tablet, Rfl: 2    pregabalin (Lyrica) 100 mg capsule, TAKE 2 CAPSULES BY MOUTH EVERY MORNING AND 2 CAPSULES BY MOUTH EVERY EVENING., Disp: , Rfl:     pregabalin (Lyrica) 100 mg capsule, Take 2 capsules (200 mg) by mouth 2 times a day., Disp: , Rfl:     rosuvastatin (Crestor) 5 mg tablet, One tablet nightly., Disp: 30 tablet, Rfl: 5

## 2025-04-17 NOTE — PATIENT INSTRUCTIONS
I put in a referral for you to have a colonoscopy done.  You can schedule with Dr Mar.      Return to see me in 6 months.    I ordered a urine test for the diabetes, and also you can get the blood test at your office.

## 2025-04-17 NOTE — ASSESSMENT & PLAN NOTE
Annual physical completed today.  Updated medication list today.  Referral sent today to obtain colonoscopy with Dr Mar.   Ordered urinalysis that she will have done here at

## 2025-04-23 ENCOUNTER — APPOINTMENT (OUTPATIENT)
Dept: PRIMARY CARE | Facility: CLINIC | Age: 60
End: 2025-04-23
Payer: COMMERCIAL

## 2025-04-23 VITALS
DIASTOLIC BLOOD PRESSURE: 82 MMHG | RESPIRATION RATE: 16 BRPM | BODY MASS INDEX: 42.07 KG/M2 | WEIGHT: 277.6 LBS | HEART RATE: 87 BPM | SYSTOLIC BLOOD PRESSURE: 120 MMHG | HEIGHT: 68 IN

## 2025-04-23 DIAGNOSIS — Z00.00 ROUTINE ADULT HEALTH MAINTENANCE: Primary | ICD-10-CM

## 2025-04-23 DIAGNOSIS — Z00.00 ENCOUNTER FOR PREVENTATIVE ADULT HEALTH CARE EXAMINATION: ICD-10-CM

## 2025-04-23 DIAGNOSIS — R73.03 PREDIABETES: ICD-10-CM

## 2025-04-23 DIAGNOSIS — I89.0 LYMPHEDEMA OF BOTH LOWER EXTREMITIES: ICD-10-CM

## 2025-04-23 DIAGNOSIS — E06.3 HYPOTHYROIDISM DUE TO HASHIMOTO THYROIDITIS: ICD-10-CM

## 2025-04-23 DIAGNOSIS — G47.33 OBSTRUCTIVE SLEEP APNEA: ICD-10-CM

## 2025-04-23 DIAGNOSIS — I25.10 CORONARY ARTERY DISEASE, UNSPECIFIED VESSEL OR LESION TYPE, UNSPECIFIED WHETHER ANGINA PRESENT, UNSPECIFIED WHETHER NATIVE OR TRANSPLANTED HEART: ICD-10-CM

## 2025-04-23 DIAGNOSIS — L03.114 CELLULITIS OF HAND, LEFT: ICD-10-CM

## 2025-04-23 DIAGNOSIS — Z12.11 SCREEN FOR COLON CANCER: ICD-10-CM

## 2025-04-23 DIAGNOSIS — R03.0 WHITE COAT SYNDROME WITH HIGH BLOOD PRESSURE WITHOUT HYPERTENSION: ICD-10-CM

## 2025-04-23 PROCEDURE — 1036F TOBACCO NON-USER: CPT | Performed by: INTERNAL MEDICINE

## 2025-04-23 PROCEDURE — 3008F BODY MASS INDEX DOCD: CPT | Performed by: INTERNAL MEDICINE

## 2025-04-23 PROCEDURE — 3079F DIAST BP 80-89 MM HG: CPT | Performed by: INTERNAL MEDICINE

## 2025-04-23 PROCEDURE — 3074F SYST BP LT 130 MM HG: CPT | Performed by: INTERNAL MEDICINE

## 2025-04-23 PROCEDURE — 99396 PREV VISIT EST AGE 40-64: CPT | Performed by: INTERNAL MEDICINE

## 2025-04-23 RX ORDER — METFORMIN HYDROCHLORIDE 500 MG/1
1000 TABLET, EXTENDED RELEASE ORAL
COMMUNITY

## 2025-04-23 RX ORDER — SEMAGLUTIDE 2.5 MG/ML
2.5 VIAL (ML) SUBCUTANEOUS
COMMUNITY

## 2025-04-23 ASSESSMENT — ENCOUNTER SYMPTOMS
PALPITATIONS: 0
BACK PAIN: 1
SHORTNESS OF BREATH: 0
DIARRHEA: 0
UNEXPECTED WEIGHT CHANGE: 0
CONSTIPATION: 1

## 2025-04-23 ASSESSMENT — PATIENT HEALTH QUESTIONNAIRE - PHQ9
1. LITTLE INTEREST OR PLEASURE IN DOING THINGS: NOT AT ALL
2. FEELING DOWN, DEPRESSED OR HOPELESS: NOT AT ALL
SUM OF ALL RESPONSES TO PHQ9 QUESTIONS 1 AND 2: 0

## 2025-04-23 ASSESSMENT — PAIN SCALES - GENERAL: PAINLEVEL_OUTOF10: 0-NO PAIN

## 2025-05-08 NOTE — ASSESSMENT & PLAN NOTE
Considering WLS via [facility].  Seeing bariatric NP at [facility] currently  Will continue to monitor  Currently optimized on PAP, recommended to use nightly for greatest benefit.  
PEBBLES has had a split night sleep study completed on 7/21/22 at [facility] showing severe REDD with an RDI 3% of 62.7 and SpO2 [place] of 50%. CPAP was titrated to optimal pressure of 17 with an rRDI of 2.6 and SpO2 [place] of 93%. Recommendation was for initiation of CPAP at 17 with Simplus FFM. Previous testing about 10 years ago, was set up with device but stopped using due to aerophagia.     Tolerating auto bilevel PAP well. Continue current settings via [facility] with nasal mask  Supply order updated today  
yes

## 2025-07-30 DIAGNOSIS — G47.33 OBSTRUCTIVE SLEEP APNEA: Primary | ICD-10-CM

## 2025-07-30 DIAGNOSIS — E66.813 CLASS 3 SEVERE OBESITY IN ADULT: ICD-10-CM

## 2025-07-30 DIAGNOSIS — R73.03 PREDIABETES: ICD-10-CM

## 2025-08-05 DIAGNOSIS — Z12.31 ENCOUNTER FOR SCREENING MAMMOGRAM FOR BREAST CANCER: ICD-10-CM

## 2025-08-07 ENCOUNTER — TELEMEDICINE (OUTPATIENT)
Dept: PHARMACY | Facility: HOSPITAL | Age: 60
End: 2025-08-07
Payer: COMMERCIAL

## 2025-08-07 DIAGNOSIS — E66.813 CLASS 3 SEVERE OBESITY IN ADULT: Primary | ICD-10-CM

## 2025-08-07 NOTE — PROGRESS NOTES
Clinical Pharmacy Appointment    Patient ID: Andie Saavedra is a 59 y.o. female who presents for Weight Loss.    Pt is here for First appointment.     Referring Provider: Francine Holder DO  PCP: Francine Holder DO  Last visit with PCP: 5/21/25   Next visit with PCP: 10/22/25    Subjective   Drug Interactions  No relevant drug interactions were noted.    Medication System Management  Patient's preferred pharmacy: [facility]    Interval History  Patient has been taking semaglutide for a little over a year, now feeling more cravings and interested in switching to Zepbound. No longer losing weight. Would like to lose another 50-70 lbs.     Patient's insurance excludes coverage of Zepbound for all indications. Patient planning to continue compounded glp-1 as prescribed.     Clinical Pharmacist follow-up: as needed, Telehealth visit    Continue all meds under the continuation of care with the referring provider and clinical pharmacy team.    Thank you,  Evin Sansd, PharmD    Verbal consent to manage patient's drug therapy was obtained from the patient. They were informed they may decline to participate or withdraw from participation in pharmacy services at any time.

## 2025-10-22 ENCOUNTER — APPOINTMENT (OUTPATIENT)
Dept: PRIMARY CARE | Facility: CLINIC | Age: 60
End: 2025-10-22
Payer: COMMERCIAL